# Patient Record
Sex: FEMALE | Race: NATIVE HAWAIIAN OR OTHER PACIFIC ISLANDER | HISPANIC OR LATINO | Employment: UNEMPLOYED | URBAN - NONMETROPOLITAN AREA
[De-identification: names, ages, dates, MRNs, and addresses within clinical notes are randomized per-mention and may not be internally consistent; named-entity substitution may affect disease eponyms.]

---

## 2024-06-05 ENCOUNTER — INITIAL PRENATAL (OUTPATIENT)
Age: 29
End: 2024-06-05

## 2024-06-05 VITALS
WEIGHT: 136 LBS | HEIGHT: 61 IN | BODY MASS INDEX: 25.68 KG/M2 | DIASTOLIC BLOOD PRESSURE: 64 MMHG | SYSTOLIC BLOOD PRESSURE: 108 MMHG

## 2024-06-05 DIAGNOSIS — Z34.82 MULTIGRAVIDA IN SECOND TRIMESTER: Primary | ICD-10-CM

## 2024-06-05 DIAGNOSIS — Z3A.22 22 WEEKS GESTATION OF PREGNANCY: ICD-10-CM

## 2024-06-05 PROCEDURE — G0123 SCREEN CERV/VAG THIN LAYER: HCPCS

## 2024-06-05 NOTE — PROGRESS NOTES
29-year old patient arrived to initiate prenatal care.     HPI: . No LMP recorded. Patient is pregnant.  Pregnancy was a surprise. Pre-pregnancy weight of 136 lb.    Previous prenatal history significant for: vaginal birth, no complications  History significant for: n/a    Feeling fetal movement daily    The following portion of the patient's history were reviewed and updated as needed: allergies, current medications, past family history, past medical history, social history, surgical history, and problem list.    ROS: All systems reviewed and are negative with exception of the following: amenorrhea      US ordered today, reviewed and shows IUP of 22w6d gestation and Estimated Date of Delivery: 10/3/24  Intrauterine pregnancy at 22 weeks 6 days by fetal biometry  Placental location: Posterior  Estimated fetal weight:  561 g  Fetal position: Vertex  NADIR: 14.7 cm    Pap Smear done today    Exam:  Wt: 136 lb for TWG of 0 kg (0 lb), B/P 108/64, FHTs 141   General Appearance:  healthy-appearing . Appropriate mood and behavior.  HEENT:  Neck supple, no thyroidmegaly.  Cardiorespiratory: HR str and reg. No murmur. Lungs clear. Resp even and unlabored.  Abd: Soft, nontender. No CVA tenderness.   Ext: Calves non-tender. No cyanosis or edema.    Diagnoses and all orders for this visit:    1. Multigravida in second trimester (Primary)  -     Chlamydia trachomatis, Neisseria gonorrhoeae, PCR w/ confirmation - Urine, Urine, Clean Catch  -     ABO / Rh  -     Ambulatory Referral to [facility]/Perinatology  -     Antibody Screen  -     CBC & Differential  -     Hepatitis B Surface Antigen  -     ToxASSURE Select 13 (MW) - Urine, Clean Catch  -     Urine Culture - Urine, Urine, Clean Catch  -     Rubella Antibody, IgG  -     RPR  -     HIV-1 / O / 2 Ag / Antibody  -     Hepatitis C Antibody  -     Liquid-based Pap Smear, Screening  -     [facility] Panorama Prenatal Test Full Panel: Panorama Test Plus 5 Additional Microdeletions  - Blood,  -     [facility] Horizon 14 (Pan-Ethnic Standard) - Blood,    2. 22 weeks gestation of pregnancy  Reviewed information in new OB packet, including OTC medications for use during pregnancy, second trimester of pregnancy and discomforts, regular OB routine, ffDNA/chromosomal risk and maternal carrier screening testing.  Advised to maintain regular activity and/or exercise. Discussed bleeding and pelvic pain warnings and other signs to report. Discussed and ordered initial prenatal labs today. Second Trimester of Pregnancy video and round ligament pain included in AVS.       Return to the office in 4 weeks for routine follow up and as needed with concerns.      This note has been signed electronically.     Lana Pedersen CNM APRN

## 2024-06-06 DIAGNOSIS — B37.9 CANDIDIASIS: Primary | ICD-10-CM

## 2024-06-06 LAB
GEN CATEG CVX/VAG CYTO-IMP: NORMAL
LAB AP CASE REPORT: NORMAL
LAB AP GYN ADDITIONAL INFORMATION: NORMAL
LAB AP GYN OTHER FINDINGS: NORMAL
Lab: NORMAL
PATH INTERP SPEC-IMP: NORMAL
STAT OF ADQ CVX/VAG CYTO-IMP: NORMAL

## 2024-06-13 LAB
Lab: NORMAL
NTRA 1P36 DELETION SYNDROME POPULATION-BASED RISK TEXT: NORMAL
NTRA 1P36 DELETION SYNDROME RESULT TEXT: NORMAL
NTRA 1P36 DELETION SYNDROME RISK SCORE TEXT: NORMAL
NTRA 22Q11.2 DELETION SYNDROME POPULATION-BASED RISK TEXT: NORMAL
NTRA 22Q11.2 DELETION SYNDROME RESULT TEXT: NORMAL
NTRA 22Q11.2 DELETION SYNDROME RISK SCORE TEXT: NORMAL
NTRA ANGELMAN SYNDROME POPULATION-BASED RISK TEXT: NORMAL
NTRA ANGELMAN SYNDROME RESULT TEXT: NORMAL
NTRA ANGELMAN SYNDROME RISK SCORE TEXT: NORMAL
NTRA CRI-DU-CHAT SYNDROME POPULATION-BASED RISK TEXT: NORMAL
NTRA CRI-DU-CHAT SYNDROME RESULT TEXT: NORMAL
NTRA CRI-DU-CHAT SYNDROME RISK SCORE TEXT: NORMAL
NTRA FETAL FRACTION: NORMAL
NTRA GENDER OF FETUS: NORMAL
NTRA MONOSOMY X AGE-BASED RISK TEXT: NORMAL
NTRA MONOSOMY X RESULT TEXT: NORMAL
NTRA MONOSOMY X RISK SCORE TEXT: NORMAL
NTRA PRADER-WILLI SYNDROME POPULATION-BASED RISK TEXT: NORMAL
NTRA PRADER-WILLI SYNDROME RESULT TEXT: NORMAL
NTRA PRADER-WILLI SYNDROME RISK SCORE TEXT: NORMAL
NTRA TRIPLOIDY RESULT TEXT: NORMAL
NTRA TRISOMY 13 AGE-BASED RISK TEXT: NORMAL
NTRA TRISOMY 13 RESULT TEXT: NORMAL
NTRA TRISOMY 13 RISK SCORE TEXT: NORMAL
NTRA TRISOMY 18 AGE-BASED RISK TEXT: NORMAL
NTRA TRISOMY 18 RESULT TEXT: NORMAL
NTRA TRISOMY 18 RISK SCORE TEXT: NORMAL
NTRA TRISOMY 21 AGE-BASED RISK TEXT: NORMAL
NTRA TRISOMY 21 RESULT TEXT: NORMAL
NTRA TRISOMY 21 RISK SCORE TEXT: NORMAL

## 2024-06-14 LAB
ABO GROUP BLD: NORMAL
BACTERIA UR CULT: NORMAL
BACTERIA UR CULT: NORMAL
BASOPHILS # BLD AUTO: 0 X10E3/UL (ref 0–0.2)
BASOPHILS NFR BLD AUTO: 1 %
BLD GP AB SCN SERPL QL: NEGATIVE
C TRACH RRNA SPEC QL NAA+PROBE: NEGATIVE
DRUGS UR: NORMAL
EOSINOPHIL # BLD AUTO: 0.1 X10E3/UL (ref 0–0.4)
EOSINOPHIL NFR BLD AUTO: 1 %
ERYTHROCYTE [DISTWIDTH] IN BLOOD BY AUTOMATED COUNT: 13.4 % (ref 11.7–15.4)
HBV SURFACE AG SERPL QL IA: NEGATIVE
HCT VFR BLD AUTO: 34.9 % (ref 34–46.6)
HCV IGG SERPL QL IA: NON REACTIVE
HGB BLD-MCNC: 11.4 G/DL (ref 11.1–15.9)
HIV 1+2 AB+HIV1 P24 AG SERPL QL IA: NON REACTIVE
IMM GRANULOCYTES # BLD AUTO: 0 X10E3/UL (ref 0–0.1)
IMM GRANULOCYTES NFR BLD AUTO: 1 %
LYMPHOCYTES # BLD AUTO: 1.5 X10E3/UL (ref 0.7–3.1)
LYMPHOCYTES NFR BLD AUTO: 18 %
MCH RBC QN AUTO: 28.6 PG (ref 26.6–33)
MCHC RBC AUTO-ENTMCNC: 32.7 G/DL (ref 31.5–35.7)
MCV RBC AUTO: 88 FL (ref 79–97)
MONOCYTES # BLD AUTO: 0.6 X10E3/UL (ref 0.1–0.9)
MONOCYTES NFR BLD AUTO: 7 %
N GONORRHOEA RRNA SPEC QL NAA+PROBE: NEGATIVE
NEUTROPHILS # BLD AUTO: 6 X10E3/UL (ref 1.4–7)
NEUTROPHILS NFR BLD AUTO: 72 %
PLATELET # BLD AUTO: 192 X10E3/UL (ref 150–450)
RBC # BLD AUTO: 3.98 X10E6/UL (ref 3.77–5.28)
RH BLD: POSITIVE
RPR SER QL: NON REACTIVE
RUBV IGG SERPL IA-ACNC: 1.85 INDEX
WBC # BLD AUTO: 8.2 X10E3/UL (ref 3.4–10.8)

## 2024-06-16 LAB
Lab: NEGATIVE
Lab: NORMAL
NTRA ALPHA-THALASSEMIA: NEGATIVE
NTRA BETA-HEMOGLOBINOPATHIES: NEGATIVE
NTRA CANAVAN DISEASE: NEGATIVE
NTRA CYSTIC FIBROSIS: NEGATIVE
NTRA DUCHENNE/BECKER MUSCULAR DYSTROPHY: NEGATIVE
NTRA FAMILIAL DYSAUTONOMIA: NEGATIVE
NTRA FRAGILE X SYNDROME: NEGATIVE
NTRA GALACTOSEMIA: NEGATIVE
NTRA GAUCHER DISEASE: NEGATIVE
NTRA MEDIUM CHAIN ACYL-COA DEHYDROGENASE DEFICIENCY: NEGATIVE
NTRA POLYCYSTIC KIDNEY DISEASE, AUTOSOMAL RECESSIVE: NEGATIVE
NTRA SMITH-LEMLI-OPITZ SYNDROME: NEGATIVE
NTRA SPINAL MUSCULAR ATROPHY: NEGATIVE
NTRA TAY-SACHS DISEASE: NEGATIVE

## 2024-07-02 ENCOUNTER — ROUTINE PRENATAL (OUTPATIENT)
Age: 29
End: 2024-07-02

## 2024-07-02 VITALS — BODY MASS INDEX: 26.45 KG/M2 | WEIGHT: 140 LBS | SYSTOLIC BLOOD PRESSURE: 112 MMHG | DIASTOLIC BLOOD PRESSURE: 72 MMHG

## 2024-07-02 DIAGNOSIS — O09.33 LIMITED PRENATAL CARE IN THIRD TRIMESTER: Primary | ICD-10-CM

## 2024-07-02 NOTE — PROGRESS NOTES
Good fetal movement  Prior uncomplicated pregnancy and vaginal delivery  Will need to reschedule anatomy US  Reviewed normal prenatal labs, normal ffDNA, and normal maternal carrier screen  Glucola and Hgb ordered for next visit  Discussed Torres Lemons contractions    Diagnoses and all orders for this visit:    1. Limited prenatal care in third trimester (Primary)

## 2024-07-18 ENCOUNTER — ROUTINE PRENATAL (OUTPATIENT)
Age: 29
End: 2024-07-18
Payer: MEDICAID

## 2024-07-18 VITALS — BODY MASS INDEX: 27.02 KG/M2 | WEIGHT: 143 LBS | SYSTOLIC BLOOD PRESSURE: 108 MMHG | DIASTOLIC BLOOD PRESSURE: 74 MMHG

## 2024-07-18 DIAGNOSIS — O09.33 LIMITED PRENATAL CARE IN THIRD TRIMESTER: Primary | ICD-10-CM

## 2024-07-18 DIAGNOSIS — Z34.83 ENCOUNTER FOR SUPERVISION OF OTHER NORMAL PREGNANCY IN THIRD TRIMESTER: ICD-10-CM

## 2024-07-18 NOTE — PROGRESS NOTES
Good fetal movement  Had to reschedule anatomy ultrasound  Glucola and Hgb today, Rh positive  Discuss Tdap next visit  Discussed Rosebud Lemons contractions    Diagnoses and all orders for this visit:    1. Limited prenatal care in third trimester (Primary)    2. Encounter for supervision of other normal pregnancy in third trimester  -     Gestational Screen 1 Hr ([facility])  -     Hemoglobin  -     RPR Qualitative with Reflex to Quant

## 2024-07-19 LAB
GLUCOSE 1H P 50 G GLC PO SERPL-MCNC: 146 MG/DL (ref 70–139)
HGB BLD-MCNC: 11.4 G/DL (ref 11.1–15.9)
RPR SER QL: NON REACTIVE

## 2024-08-01 ENCOUNTER — ROUTINE PRENATAL (OUTPATIENT)
Age: 29
End: 2024-08-01
Payer: MEDICAID

## 2024-08-01 VITALS — SYSTOLIC BLOOD PRESSURE: 116 MMHG | BODY MASS INDEX: 27.59 KG/M2 | DIASTOLIC BLOOD PRESSURE: 72 MMHG | WEIGHT: 146 LBS

## 2024-08-01 DIAGNOSIS — Z3A.31 31 WEEKS GESTATION OF PREGNANCY: Primary | ICD-10-CM

## 2024-08-01 DIAGNOSIS — O99.810 ABNORMAL GLUCOSE TOLERANCE TEST IN PREGNANCY: ICD-10-CM

## 2024-08-01 NOTE — PROGRESS NOTES
Good fetal movement  Reviewed normal Hgb  Reviewed elevated 1 hour Glucola, will return for 3 hour GTT  Discuss Tdap next visit   labor precautions    Diagnoses and all orders for this visit:    1. 31 weeks gestation of pregnancy (Primary)  -     POC Urinalysis Dipstick    2. Abnormal glucose tolerance test in pregnancy  -     Gestational Screen 3 Hr ([facility])

## 2024-08-07 ENCOUNTER — TELEPHONE (OUTPATIENT)
Dept: OBSTETRICS AND GYNECOLOGY | Age: 29
End: 2024-08-07
Payer: MEDICAID

## 2024-08-07 LAB
GLUCOSE 1H P 100 G GLC PO SERPL-MCNC: 181 MG/DL (ref 70–179)
GLUCOSE 2H P 100 G GLC PO SERPL-MCNC: 103 MG/DL (ref 70–154)
GLUCOSE 3H P 100 G GLC PO SERPL-MCNC: 135 MG/DL (ref 70–139)
GLUCOSE P FAST SERPL-MCNC: 70 MG/DL (ref 70–94)
Lab: ABNORMAL
Lab: NORMAL

## 2024-08-07 NOTE — TELEPHONE ENCOUNTER
Hallie with [facility] asking for permission to release the 3h gtt results on this patient. I spoke with Hallie by phone and they were concerned because the patient's 3h result was above her 2h result. Notified Hallie to release results to Dr Carty for his review.

## 2024-08-15 ENCOUNTER — ROUTINE PRENATAL (OUTPATIENT)
Age: 29
End: 2024-08-15
Payer: MEDICAID

## 2024-08-15 VITALS — WEIGHT: 147 LBS | DIASTOLIC BLOOD PRESSURE: 82 MMHG | BODY MASS INDEX: 27.78 KG/M2 | SYSTOLIC BLOOD PRESSURE: 112 MMHG

## 2024-08-15 DIAGNOSIS — Z3A.33 33 WEEKS GESTATION OF PREGNANCY: ICD-10-CM

## 2024-08-15 DIAGNOSIS — Z34.83 ENCOUNTER FOR SUPERVISION OF OTHER NORMAL PREGNANCY IN THIRD TRIMESTER: Primary | ICD-10-CM

## 2024-08-15 NOTE — PROGRESS NOTES
Good fetal movement  No contractions  Reviewed normal 3 hour Glucola  Tdap in office today   labor precautions    Diagnoses and all orders for this visit:    1. Encounter for supervision of other normal pregnancy in third trimester (Primary)  -     Tdap Vaccine Greater Than or Equal To 6yo IM    2. 33 weeks gestation of pregnancy

## 2024-08-30 ENCOUNTER — ROUTINE PRENATAL (OUTPATIENT)
Age: 29
End: 2024-08-30
Payer: MEDICAID

## 2024-08-30 VITALS — WEIGHT: 148 LBS | DIASTOLIC BLOOD PRESSURE: 84 MMHG | BODY MASS INDEX: 27.96 KG/M2 | SYSTOLIC BLOOD PRESSURE: 112 MMHG

## 2024-08-30 DIAGNOSIS — Z34.83 ENCOUNTER FOR SUPERVISION OF OTHER NORMAL PREGNANCY IN THIRD TRIMESTER: Primary | ICD-10-CM

## 2024-08-30 NOTE — PROGRESS NOTES
Good fetal movement  Labor precautions  Reviewed normal 3 hour glucose screening  Growth US next visit for size < dates and late entry to care  GBS and cx's next visit    Diagnoses and all orders for this visit:    1. Encounter for supervision of other normal pregnancy in third trimester (Primary)

## 2024-09-03 ENCOUNTER — ROUTINE PRENATAL (OUTPATIENT)
Age: 29
End: 2024-09-03
Payer: MEDICAID

## 2024-09-03 VITALS — DIASTOLIC BLOOD PRESSURE: 84 MMHG | WEIGHT: 148 LBS | SYSTOLIC BLOOD PRESSURE: 112 MMHG | BODY MASS INDEX: 27.96 KG/M2

## 2024-09-03 DIAGNOSIS — Z34.83 ENCOUNTER FOR SUPERVISION OF OTHER NORMAL PREGNANCY IN THIRD TRIMESTER: Primary | ICD-10-CM

## 2024-09-03 PROCEDURE — 99213 OFFICE O/P EST LOW 20 MIN: CPT | Performed by: OBSTETRICS & GYNECOLOGY

## 2024-09-03 NOTE — PROGRESS NOTES
Good fetal movement  Has had a few contractions  US today 35% growth, NADIR 14cm, vertex  Cervix 1cm/50/-3 moderate, posterior  GBS and GC/Chl ordered and done  Labor instructions    Diagnoses and all orders for this visit:    1. Encounter for supervision of other normal pregnancy in third trimester (Primary)  -     Chlamydia trachomatis, Neisseria gonorrhoeae, PCR w/ confirmation - Swab, Cervix  -     Strep B Screen - Swab, Vaginal/Rectum

## 2024-09-06 LAB
C TRACH RRNA SPEC QL NAA+PROBE: NEGATIVE
GP B STREP DNA SPEC QL NAA+PROBE: NEGATIVE
N GONORRHOEA RRNA SPEC QL NAA+PROBE: NEGATIVE

## 2024-09-10 ENCOUNTER — ROUTINE PRENATAL (OUTPATIENT)
Age: 29
End: 2024-09-10
Payer: MEDICAID

## 2024-09-10 VITALS — SYSTOLIC BLOOD PRESSURE: 116 MMHG | WEIGHT: 149 LBS | DIASTOLIC BLOOD PRESSURE: 82 MMHG | BODY MASS INDEX: 28.15 KG/M2

## 2024-09-10 DIAGNOSIS — Z34.83 ENCOUNTER FOR SUPERVISION OF OTHER NORMAL PREGNANCY IN THIRD TRIMESTER: Primary | ICD-10-CM

## 2024-09-10 PROCEDURE — 99213 OFFICE O/P EST LOW 20 MIN: CPT | Performed by: OBSTETRICS & GYNECOLOGY

## 2024-09-10 NOTE — PROGRESS NOTES
Good fetal movement  Has had a few contractions  Reviewed GBS negative  Labor instructions    Diagnoses and all orders for this visit:    1. Encounter for supervision of other normal pregnancy in third trimester (Primary)

## 2024-09-17 ENCOUNTER — ROUTINE PRENATAL (OUTPATIENT)
Age: 29
End: 2024-09-17
Payer: MEDICAID

## 2024-09-17 VITALS — BODY MASS INDEX: 28.34 KG/M2 | DIASTOLIC BLOOD PRESSURE: 80 MMHG | SYSTOLIC BLOOD PRESSURE: 114 MMHG | WEIGHT: 150 LBS

## 2024-09-17 DIAGNOSIS — Z3A.37 37 WEEKS GESTATION OF PREGNANCY: Primary | ICD-10-CM

## 2024-09-17 DIAGNOSIS — B37.31 VAGINAL YEAST INFECTION: ICD-10-CM

## 2024-09-17 DIAGNOSIS — R10.2 VAGINAL PAIN: ICD-10-CM

## 2024-09-17 LAB
BILIRUB BLD-MCNC: NEGATIVE MG/DL
CLARITY, POC: CLEAR
COLOR UR: YELLOW
GLUCOSE UR STRIP-MCNC: NEGATIVE MG/DL
KETONES UR QL: ABNORMAL
LEUKOCYTE EST, POC: ABNORMAL
NITRITE UR-MCNC: NEGATIVE MG/ML
PH UR: 8 [PH] (ref 5–8)
PROT UR STRIP-MCNC: ABNORMAL MG/DL
RBC # UR STRIP: NEGATIVE /UL
SP GR UR: 1.01 (ref 1–1.03)
UROBILINOGEN UR QL: NORMAL

## 2024-09-17 PROCEDURE — 99213 OFFICE O/P EST LOW 20 MIN: CPT

## 2024-09-17 RX ORDER — MICONAZOLE NITRATE 2 %
1 CREAM WITH APPLICATOR VAGINAL NIGHTLY
Qty: 7 G | Refills: 0 | Status: SHIPPED | OUTPATIENT
Start: 2024-09-17 | End: 2024-09-24

## 2024-09-19 LAB
A VAGINAE DNA VAG QL NAA+PROBE: ABNORMAL SCORE
BVAB2 DNA VAG QL NAA+PROBE: ABNORMAL SCORE
C ALBICANS DNA VAG QL NAA+PROBE: POSITIVE
C GLABRATA DNA VAG QL NAA+PROBE: NEGATIVE
C TRACH DNA SPEC QL NAA+PROBE: NEGATIVE
MEGA1 DNA VAG QL NAA+PROBE: ABNORMAL SCORE
N GONORRHOEA DNA VAG QL NAA+PROBE: NEGATIVE
T VAGINALIS DNA VAG QL NAA+PROBE: NEGATIVE

## 2024-09-24 ENCOUNTER — ROUTINE PRENATAL (OUTPATIENT)
Age: 29
End: 2024-09-24
Payer: MEDICAID

## 2024-09-24 VITALS — BODY MASS INDEX: 28.72 KG/M2 | DIASTOLIC BLOOD PRESSURE: 84 MMHG | WEIGHT: 152 LBS | SYSTOLIC BLOOD PRESSURE: 130 MMHG

## 2024-09-24 DIAGNOSIS — Z34.83 ENCOUNTER FOR SUPERVISION OF OTHER NORMAL PREGNANCY IN THIRD TRIMESTER: Primary | ICD-10-CM

## 2024-09-24 PROCEDURE — 99213 OFFICE O/P EST LOW 20 MIN: CPT | Performed by: OBSTETRICS & GYNECOLOGY

## 2024-09-30 ENCOUNTER — HOSPITAL ENCOUNTER (INPATIENT)
Facility: HOSPITAL | Age: 29
LOS: 2 days | Discharge: HOME | End: 2024-10-02
Attending: OBSTETRICS & GYNECOLOGY | Admitting: OBSTETRICS & GYNECOLOGY
Payer: MEDICAID

## 2024-09-30 PROBLEM — R10.9 ABDOMINAL PAIN DURING PREGNANCY IN THIRD TRIMESTER: Status: RESOLVED | Noted: 2024-09-30 | Resolved: 2024-09-30

## 2024-09-30 PROBLEM — O26.893 ABDOMINAL PAIN DURING PREGNANCY IN THIRD TRIMESTER: Status: ACTIVE | Noted: 2024-09-30

## 2024-09-30 PROBLEM — Z3A.39 39 WEEKS GESTATION OF PREGNANCY: Status: RESOLVED | Noted: 2024-09-30 | Resolved: 2024-09-30

## 2024-09-30 PROBLEM — O26.893 ABDOMINAL PAIN DURING PREGNANCY IN THIRD TRIMESTER: Status: RESOLVED | Noted: 2024-09-30 | Resolved: 2024-09-30

## 2024-09-30 PROBLEM — Z3A.39 39 WEEKS GESTATION OF PREGNANCY: Status: ACTIVE | Noted: 2024-09-30

## 2024-09-30 PROBLEM — Z34.90 PREGNANCY: Status: ACTIVE | Noted: 2024-09-30

## 2024-09-30 PROBLEM — Z34.90 PREGNANCY: Status: RESOLVED | Noted: 2024-09-30 | Resolved: 2024-09-30

## 2024-09-30 PROBLEM — R10.9 ABDOMINAL PAIN DURING PREGNANCY IN THIRD TRIMESTER: Status: ACTIVE | Noted: 2024-09-30

## 2024-09-30 LAB
ABO GROUP BLD: NORMAL
BLD GP AB SCN SERPL QL: NEGATIVE
DEPRECATED RDW RBC AUTO: 45.9 FL (ref 37–54)
ERYTHROCYTE [DISTWIDTH] IN BLOOD BY AUTOMATED COUNT: 15.9 % (ref 12.3–15.4)
HCT VFR BLD AUTO: 36.4 % (ref 34–46.6)
HGB BLD-MCNC: 11.4 G/DL (ref 12–15.9)
MCH RBC QN AUTO: 25.2 PG (ref 26.6–33)
MCHC RBC AUTO-ENTMCNC: 31.3 G/DL (ref 31.5–35.7)
MCV RBC AUTO: 80.5 FL (ref 79–97)
PLATELET # BLD AUTO: 176 10*3/MM3 (ref 140–450)
PMV BLD AUTO: 13.1 FL (ref 6–12)
RBC # BLD AUTO: 4.52 10*6/MM3 (ref 3.77–5.28)
RH BLD: POSITIVE
T&S EXPIRATION DATE: NORMAL
TREPONEMA PALLIDUM IGG+IGM AB [PRESENCE] IN SERUM OR PLASMA BY IMMUNOASSAY: NORMAL
WBC NRBC COR # BLD AUTO: 12.59 10*3/MM3 (ref 3.4–10.8)

## 2024-09-30 PROCEDURE — 10907ZC DRAINAGE OF AMNIOTIC FLUID, THERAPEUTIC FROM PRODUCTS OF CONCEPTION, VIA NATURAL OR ARTIFICIAL OPENING: ICD-10-PCS | Performed by: OBSTETRICS & GYNECOLOGY

## 2024-09-30 PROCEDURE — 25810000003 LACTATED RINGERS SOLUTION: Performed by: OBSTETRICS & GYNECOLOGY

## 2024-09-30 PROCEDURE — 86900 BLOOD TYPING SEROLOGIC ABO: CPT | Performed by: OBSTETRICS & GYNECOLOGY

## 2024-09-30 PROCEDURE — 86901 BLOOD TYPING SEROLOGIC RH(D): CPT | Performed by: OBSTETRICS & GYNECOLOGY

## 2024-09-30 PROCEDURE — 0HQ9XZZ REPAIR PERINEUM SKIN, EXTERNAL APPROACH: ICD-10-PCS | Performed by: OBSTETRICS & GYNECOLOGY

## 2024-09-30 PROCEDURE — 86780 TREPONEMA PALLIDUM: CPT | Performed by: OBSTETRICS & GYNECOLOGY

## 2024-09-30 PROCEDURE — 86850 RBC ANTIBODY SCREEN: CPT | Performed by: OBSTETRICS & GYNECOLOGY

## 2024-09-30 PROCEDURE — 85027 COMPLETE CBC AUTOMATED: CPT | Performed by: OBSTETRICS & GYNECOLOGY

## 2024-09-30 RX ORDER — ONDANSETRON 4 MG/1
4 TABLET, ORALLY DISINTEGRATING ORAL EVERY 6 HOURS PRN
Status: DISCONTINUED | OUTPATIENT
Start: 2024-09-30 | End: 2024-09-30 | Stop reason: HOSPADM

## 2024-09-30 RX ORDER — CALCIUM CARBONATE 500 MG/1
2 TABLET, CHEWABLE ORAL 3 TIMES DAILY PRN
Status: DISCONTINUED | OUTPATIENT
Start: 2024-09-30 | End: 2024-10-02 | Stop reason: HOSPADM

## 2024-09-30 RX ORDER — IBUPROFEN 600 MG/1
600 TABLET, FILM COATED ORAL EVERY 6 HOURS PRN
Status: DISCONTINUED | OUTPATIENT
Start: 2024-09-30 | End: 2024-10-02 | Stop reason: HOSPADM

## 2024-09-30 RX ORDER — PRENATAL VIT/IRON FUM/FOLIC AC 27MG-0.8MG
1 TABLET ORAL DAILY
Status: DISCONTINUED | OUTPATIENT
Start: 2024-09-30 | End: 2024-10-02 | Stop reason: HOSPADM

## 2024-09-30 RX ORDER — OXYTOCIN/0.9 % SODIUM CHLORIDE 30/500 ML
PLASTIC BAG, INJECTION (ML) INTRAVENOUS
Status: COMPLETED
Start: 2024-09-30 | End: 2024-09-30

## 2024-09-30 RX ORDER — ACETAMINOPHEN 325 MG/1
650 TABLET ORAL EVERY 6 HOURS PRN
Status: DISCONTINUED | OUTPATIENT
Start: 2024-09-30 | End: 2024-10-02 | Stop reason: HOSPADM

## 2024-09-30 RX ORDER — PROMETHAZINE HYDROCHLORIDE 25 MG/1
12.5 TABLET ORAL EVERY 6 HOURS PRN
Status: DISCONTINUED | OUTPATIENT
Start: 2024-09-30 | End: 2024-09-30 | Stop reason: SDUPTHER

## 2024-09-30 RX ORDER — HYDROCODONE BITARTRATE AND ACETAMINOPHEN 5; 325 MG/1; MG/1
1 TABLET ORAL EVERY 4 HOURS PRN
Status: DISCONTINUED | OUTPATIENT
Start: 2024-09-30 | End: 2024-10-02 | Stop reason: HOSPADM

## 2024-09-30 RX ORDER — ONDANSETRON 4 MG/1
4 TABLET, ORALLY DISINTEGRATING ORAL EVERY 6 HOURS PRN
Status: DISCONTINUED | OUTPATIENT
Start: 2024-09-30 | End: 2024-09-30 | Stop reason: SDUPTHER

## 2024-09-30 RX ORDER — ONDANSETRON 4 MG/1
4 TABLET, ORALLY DISINTEGRATING ORAL EVERY 8 HOURS PRN
Status: DISCONTINUED | OUTPATIENT
Start: 2024-09-30 | End: 2024-10-02 | Stop reason: HOSPADM

## 2024-09-30 RX ORDER — ACETAMINOPHEN 325 MG/1
650 TABLET ORAL EVERY 4 HOURS PRN
Status: DISCONTINUED | OUTPATIENT
Start: 2024-09-30 | End: 2024-10-01 | Stop reason: SDUPTHER

## 2024-09-30 RX ORDER — MISOPROSTOL 200 UG/1
800 TABLET ORAL ONCE AS NEEDED
Status: DISCONTINUED | OUTPATIENT
Start: 2024-09-30 | End: 2024-09-30 | Stop reason: HOSPADM

## 2024-09-30 RX ORDER — ACETAMINOPHEN 325 MG/1
650 TABLET ORAL EVERY 4 HOURS PRN
Status: DISCONTINUED | OUTPATIENT
Start: 2024-09-30 | End: 2024-09-30 | Stop reason: HOSPADM

## 2024-09-30 RX ORDER — METHYLERGONOVINE MALEATE 0.2 MG/ML
200 INJECTION INTRAVENOUS ONCE AS NEEDED
Status: DISCONTINUED | OUTPATIENT
Start: 2024-09-30 | End: 2024-09-30 | Stop reason: HOSPADM

## 2024-09-30 RX ORDER — MORPHINE SULFATE 2 MG/ML
1 INJECTION, SOLUTION INTRAMUSCULAR; INTRAVENOUS EVERY 4 HOURS PRN
Status: DISCONTINUED | OUTPATIENT
Start: 2024-09-30 | End: 2024-10-02 | Stop reason: HOSPADM

## 2024-09-30 RX ORDER — METHYLERGONOVINE MALEATE 0.2 MG/ML
200 INJECTION INTRAVENOUS ONCE AS NEEDED
Status: DISCONTINUED | OUTPATIENT
Start: 2024-09-30 | End: 2024-10-02 | Stop reason: HOSPADM

## 2024-09-30 RX ORDER — SODIUM CHLORIDE 9 MG/ML
40 INJECTION, SOLUTION INTRAVENOUS AS NEEDED
Status: DISCONTINUED | OUTPATIENT
Start: 2024-09-30 | End: 2024-09-30 | Stop reason: HOSPADM

## 2024-09-30 RX ORDER — CALCIUM CARBONATE 500 MG/1
2 TABLET, CHEWABLE ORAL 3 TIMES DAILY PRN
Status: DISCONTINUED | OUTPATIENT
Start: 2024-09-30 | End: 2024-09-30 | Stop reason: SDUPTHER

## 2024-09-30 RX ORDER — SODIUM CHLORIDE 0.9 % (FLUSH) 0.9 %
10 SYRINGE (ML) INJECTION EVERY 12 HOURS SCHEDULED
Status: DISCONTINUED | OUTPATIENT
Start: 2024-09-30 | End: 2024-09-30 | Stop reason: HOSPADM

## 2024-09-30 RX ORDER — ONDANSETRON 2 MG/ML
4 INJECTION INTRAMUSCULAR; INTRAVENOUS EVERY 6 HOURS PRN
Status: DISCONTINUED | OUTPATIENT
Start: 2024-09-30 | End: 2024-10-02 | Stop reason: HOSPADM

## 2024-09-30 RX ORDER — HYDROCORTISONE 25 MG/G
1 CREAM TOPICAL AS NEEDED
Status: DISCONTINUED | OUTPATIENT
Start: 2024-09-30 | End: 2024-10-02 | Stop reason: HOSPADM

## 2024-09-30 RX ORDER — SODIUM CHLORIDE, SODIUM LACTATE, POTASSIUM CHLORIDE, CALCIUM CHLORIDE 600; 310; 30; 20 MG/100ML; MG/100ML; MG/100ML; MG/100ML
125 INJECTION, SOLUTION INTRAVENOUS CONTINUOUS
Status: DISCONTINUED | OUTPATIENT
Start: 2024-09-30 | End: 2024-10-02 | Stop reason: HOSPADM

## 2024-09-30 RX ORDER — MORPHINE SULFATE 10 MG/ML
10 INJECTION INTRAMUSCULAR; INTRAVENOUS; SUBCUTANEOUS
Status: DISCONTINUED | OUTPATIENT
Start: 2024-09-30 | End: 2024-09-30 | Stop reason: HOSPADM

## 2024-09-30 RX ORDER — PROMETHAZINE HYDROCHLORIDE 25 MG/1
12.5 TABLET ORAL EVERY 6 HOURS PRN
Status: DISCONTINUED | OUTPATIENT
Start: 2024-09-30 | End: 2024-09-30 | Stop reason: HOSPADM

## 2024-09-30 RX ORDER — OXYTOCIN/0.9 % SODIUM CHLORIDE 30/500 ML
999 PLASTIC BAG, INJECTION (ML) INTRAVENOUS ONCE
Status: COMPLETED | OUTPATIENT
Start: 2024-09-30 | End: 2024-09-30

## 2024-09-30 RX ORDER — OXYTOCIN/0.9 % SODIUM CHLORIDE 30/500 ML
250 PLASTIC BAG, INJECTION (ML) INTRAVENOUS CONTINUOUS
Status: DISPENSED | OUTPATIENT
Start: 2024-09-30 | End: 2024-09-30

## 2024-09-30 RX ORDER — LIDOCAINE HYDROCHLORIDE 20 MG/ML
20 INJECTION, SOLUTION INFILTRATION; PERINEURAL ONCE AS NEEDED
Status: COMPLETED | OUTPATIENT
Start: 2024-09-30 | End: 2024-09-30

## 2024-09-30 RX ORDER — PROMETHAZINE HYDROCHLORIDE 12.5 MG/1
12.5 SUPPOSITORY RECTAL EVERY 6 HOURS PRN
Status: DISCONTINUED | OUTPATIENT
Start: 2024-09-30 | End: 2024-10-02 | Stop reason: HOSPADM

## 2024-09-30 RX ORDER — PROMETHAZINE HYDROCHLORIDE 12.5 MG/1
12.5 SUPPOSITORY RECTAL EVERY 6 HOURS PRN
Status: DISCONTINUED | OUTPATIENT
Start: 2024-09-30 | End: 2024-09-30 | Stop reason: HOSPADM

## 2024-09-30 RX ORDER — FAMOTIDINE 20 MG/1
20 TABLET, FILM COATED ORAL ONCE AS NEEDED
Status: DISCONTINUED | OUTPATIENT
Start: 2024-09-30 | End: 2024-10-02 | Stop reason: HOSPADM

## 2024-09-30 RX ORDER — ONDANSETRON 2 MG/ML
4 INJECTION INTRAMUSCULAR; INTRAVENOUS EVERY 6 HOURS PRN
Status: DISCONTINUED | OUTPATIENT
Start: 2024-09-30 | End: 2024-09-30 | Stop reason: HOSPADM

## 2024-09-30 RX ORDER — CARBOPROST TROMETHAMINE 250 UG/ML
250 INJECTION, SOLUTION INTRAMUSCULAR
Status: DISCONTINUED | OUTPATIENT
Start: 2024-09-30 | End: 2024-09-30 | Stop reason: HOSPADM

## 2024-09-30 RX ORDER — MISOPROSTOL 200 UG/1
600 TABLET ORAL ONCE AS NEEDED
Status: DISCONTINUED | OUTPATIENT
Start: 2024-09-30 | End: 2024-10-02 | Stop reason: HOSPADM

## 2024-09-30 RX ORDER — HYDROXYZINE HYDROCHLORIDE 25 MG/1
50 TABLET, FILM COATED ORAL NIGHTLY PRN
Status: DISCONTINUED | OUTPATIENT
Start: 2024-09-30 | End: 2024-10-02 | Stop reason: HOSPADM

## 2024-09-30 RX ORDER — NALOXONE HCL 0.4 MG/ML
0.4 VIAL (ML) INJECTION
Status: DISCONTINUED | OUTPATIENT
Start: 2024-09-30 | End: 2024-10-02 | Stop reason: HOSPADM

## 2024-09-30 RX ORDER — SODIUM CHLORIDE 0.9 % (FLUSH) 0.9 %
10 SYRINGE (ML) INJECTION AS NEEDED
Status: DISCONTINUED | OUTPATIENT
Start: 2024-09-30 | End: 2024-09-30 | Stop reason: HOSPADM

## 2024-09-30 RX ORDER — PROMETHAZINE HYDROCHLORIDE 25 MG/1
25 TABLET ORAL EVERY 6 HOURS PRN
Status: DISCONTINUED | OUTPATIENT
Start: 2024-09-30 | End: 2024-10-02 | Stop reason: HOSPADM

## 2024-09-30 RX ORDER — TERBUTALINE SULFATE 1 MG/ML
0.25 INJECTION, SOLUTION SUBCUTANEOUS AS NEEDED
Status: DISCONTINUED | OUTPATIENT
Start: 2024-09-30 | End: 2024-09-30 | Stop reason: HOSPADM

## 2024-09-30 RX ORDER — DOCUSATE SODIUM 100 MG/1
100 CAPSULE, LIQUID FILLED ORAL 2 TIMES DAILY
Status: DISCONTINUED | OUTPATIENT
Start: 2024-09-30 | End: 2024-10-02 | Stop reason: HOSPADM

## 2024-09-30 RX ORDER — HYDROCODONE BITARTRATE AND ACETAMINOPHEN 10; 325 MG/1; MG/1
1 TABLET ORAL EVERY 4 HOURS PRN
Status: DISCONTINUED | OUTPATIENT
Start: 2024-09-30 | End: 2024-10-02 | Stop reason: HOSPADM

## 2024-09-30 RX ORDER — FAMOTIDINE 10 MG/ML
20 INJECTION, SOLUTION INTRAVENOUS ONCE AS NEEDED
Status: DISCONTINUED | OUTPATIENT
Start: 2024-09-30 | End: 2024-10-02 | Stop reason: HOSPADM

## 2024-09-30 RX ORDER — ONDANSETRON 2 MG/ML
4 INJECTION INTRAMUSCULAR; INTRAVENOUS EVERY 6 HOURS PRN
Status: DISCONTINUED | OUTPATIENT
Start: 2024-09-30 | End: 2024-09-30 | Stop reason: SDUPTHER

## 2024-09-30 RX ORDER — SODIUM CHLORIDE 0.9 % (FLUSH) 0.9 %
1-10 SYRINGE (ML) INJECTION AS NEEDED
Status: DISCONTINUED | OUTPATIENT
Start: 2024-09-30 | End: 2024-10-02 | Stop reason: HOSPADM

## 2024-09-30 RX ORDER — OXYTOCIN/0.9 % SODIUM CHLORIDE 30/500 ML
125 PLASTIC BAG, INJECTION (ML) INTRAVENOUS ONCE AS NEEDED
Status: DISCONTINUED | OUTPATIENT
Start: 2024-09-30 | End: 2024-10-02 | Stop reason: HOSPADM

## 2024-09-30 RX ORDER — BISACODYL 10 MG
10 SUPPOSITORY, RECTAL RECTAL DAILY PRN
Status: DISCONTINUED | OUTPATIENT
Start: 2024-10-01 | End: 2024-10-02 | Stop reason: HOSPADM

## 2024-09-30 RX ORDER — PROMETHAZINE HYDROCHLORIDE 12.5 MG/1
12.5 SUPPOSITORY RECTAL EVERY 6 HOURS PRN
Status: DISCONTINUED | OUTPATIENT
Start: 2024-09-30 | End: 2024-09-30 | Stop reason: SDUPTHER

## 2024-09-30 RX ADMIN — DOCUSATE SODIUM 100 MG: 100 CAPSULE, LIQUID FILLED ORAL at 09:00

## 2024-09-30 RX ADMIN — PRENATAL VIT W/ FE FUMARATE-FA TAB 27-0.8 MG 1 TABLET: 27-0.8 TAB at 09:00

## 2024-09-30 RX ADMIN — LIDOCAINE HYDROCHLORIDE 20 ML: 20 INJECTION, SOLUTION INFILTRATION; PERINEURAL at 03:38

## 2024-09-30 RX ADMIN — SODIUM CHLORIDE, POTASSIUM CHLORIDE, SODIUM LACTATE AND CALCIUM CHLORIDE 1000 ML: 600; 310; 30; 20 INJECTION, SOLUTION INTRAVENOUS at 02:48

## 2024-09-30 RX ADMIN — DOCUSATE SODIUM 100 MG: 100 CAPSULE, LIQUID FILLED ORAL at 20:14

## 2024-09-30 RX ADMIN — Medication 999 ML/HR: at 03:27

## 2024-09-30 NOTE — LACTATION NOTE
Mother's Name: Amanda Phone #:  Infant Name: Nae  :2024 at 0325  Gestation:39w4d  Day of life:0  Birth weight:  7-7.9 (3400g) Discharge weight:  Weight Loss:   24 hour Summary of Feeds: 1BF Voids:  Stools:  Assistive devices (shields, shells, etc):na  Significant Maternal history:, breast fed first child for 2 years ( now 12 yrs old), nonenglish speaking- SELENE'charissa', late prenatal care at 22w6d  Maternal Concerns:  denies  Maternal Goal: breast and formula  Mother's Medications:   Breastpump for home: yes  Ped follow up appt:      Family at bedside. Confirms mother has a pump at home- gift from family member. Manual pump provided as well. Translation service used for consult. Cam[place]onian breastfeeding book provided. Reiterated supply/demand and need to pump if formula provided in place of breastfeeding session. Reiterated feeding frequency desired: on demand with infant hunger cues or waking infant every 3 hours, goal of 15-20 mins on each breast. Discussed output goals through the first week. Questions denied at this time. Encouragement and support provided.     Instructed patient our lactation team is here for continued support throughout their breastfeeding journey. Our team has encouraged patient to call with any questions or concerns that may arise after discharge.     All Lactation handouts listed below are in Cam[place]onian, Packet of Education handouts given to mom from [email or web address]  Breastfeeding Log  By [employer]   Congratulations on Your New Baby by Lactation education resources  5 Keys to Successful Breastfeeding by Lactation education resources  Breastfeeding in Hospital by Lactation education resources  Hunger Cues by Lactation education resources  Waking Sleepy Baby by Lactation education resources  Importance of Latch on by Lactation education resources  Positioning Latch on Baby Led by Lactation education resources  Positioning Latch on Mother Led by Lactation education resources  Check List  for positioning and latch by Lactation education resources  Breast Massage and Compression by Lactation education resources  Signs of a good feeding by Lactation education resources  Signs of a poor feeding by Lactation education resources  Getting enough to eat by Lactation education resources  Hand expression of breast milk by Lactation education resources  Calming a crying  by Lactation education resources  Survival guide for 1st two weeks by Lactation education resources  Storage and handling of Breast milk by Lactation education resources  Increasing supply by Lactation education resources  Let Down by Lactation education resources  Sore Nipples by Lactation education resources  Baby's second day by Lactation education resources  Breast pump by Lactation education resources  Plugged duct mastitis by Lactation education resources

## 2024-09-30 NOTE — L&D DELIVERY NOTE
[facility]  Vaginal Delivery Note      Diagnosis   Intrauterine pregnancy @: 39w4d   Labor Status: Spontaneous Onset of Labor    Diagnosis:   Abdominal pain during pregnancy in third trimester    39 weeks gestation of pregnancy           Delivery details     Delivery: Vaginal, Spontaneous     YOB: 2024    Time of Birth: 3:25 AM      Anesthesia: None     Delivering clinician: Rubén Barrett    Forceps?   No   Vacuum? No    Shoulder dystocia present: No      Infant details    Findings: female  infant     Infant observations: Weight: 3400 g (7 lb 7.9 oz)   Length: 20  in   Apgars: 7  @ 1 minute /    9  @ 5 minutes     Placenta, Cord, and Fluid    Placenta delivered  Spontaneous  at   2024  3:30 AM     Cord: 3 vessels  present.   Nuchal Cord?  yes; Number of nuchal loops present:  1    Cord blood obtained: Yes      Repair    Episiotomy: None    Lacerations: No   Estimated Blood Loss:  150cc   Suture used for repair: 3-0 Vicryl     Delivery Details:    Ashli, a 29 y.o.  at 39w4d, delivered a viable female  infant at 3:25 AM  on 2024 . She was complete and began pushing at 2024  3:10 AM . The fetal vertex was delivered in Vertex    Occiput  Anterior  position. A tight nuchal cord x 1 was reduced after delivery. The right anterior shoulder was delivered atraumatically by maternal expulsive efforts with the assistance of gentle downward traction. The posterior shoulder delivered with maternal expulsive efforts and gentle upward traction. The remainder of the fetus delivered spontaneously. Following a 60 second delay in cord clamping, the cord was clamped x2 and cut. Cord blood was obtained for analysis and arterial blood gas analysis. During the third stage of labor, IV pitocin was administered to enhance uterine contraction. The placenta delivered spontaneously, intact, with a three vessel cord at 2024  3:30 AM . Placental disposition was discarded . Fundal massage performed  and fundus found to be firm. Perineum, vagina, cervix were inspected, and the following lacerations were noted: 1st degree perineal. The laceration was infiltrated with 1% lidocaine and repaired with 2 interrupted stitch of 3-0 Vicryl.  Excellent hemostasis was noted. Needle count correct. Infant and patient in delivery room in good and stable condition.      Complications  none    Disposition  Mother to Mother Baby/Postpartum  in stable condition currently.  Baby to remains with mom  in stable condition currently.      Rubén Barrett DO  09/30/24  03:57 CDT

## 2024-09-30 NOTE — NURSING NOTE
Pt ambulates to bathroom with strong steady gait. Voids without difficulty. Lesia care instructed and provided. Gown changed.

## 2024-09-30 NOTE — PLAN OF CARE
Problem: Adult Inpatient Plan of Care  Goal: Patient-Specific Goal (Individualized)  9/30/2024 1525 by Cora Hinojosa RNA  Outcome: Progressing   Goal Outcome Evaluation:  Plan of Care Reviewed With: patient        Progress: improving  Outcome Evaluation: VSS WNL. FML 1U. Scant lochia. Breast and formula feeding. Voiding. No c/o pain this shift.

## 2024-09-30 NOTE — PLAN OF CARE
Goal Outcome Evaluation:  Plan of Care Reviewed With: patient        Progress: improving  Outcome Evaluation: Pt delivered vaginally with first degree with repair. No epidural. Plans to breastfeed and formula feed . Voided without dificulty. Bonding appropriately with .

## 2024-09-30 NOTE — H&P
[facility]  Obstetric history and physical        Subjective     Ashli is a 29 y.o.  currently at 39w4d, who presents to the [facility] with contraction that began around 7pm this evening. Denied loss of fluid. Good fetal movement.       Prenatal Information:  Prenatal Results       Initial Prenatal Labs       Test Value Reference Range Date Time    Hemoglobin        Hematocrit        Platelets        Rubella IgG  1.85 index Immune >0.99 24 0949    Hepatitis B SAg  Negative  Negative 24 0949    Hepatitis C Ab  Non Reactive  Non Reactive 24 0949    RPR  Non Reactive  Non Reactive 24 0831       Non Reactive  Non Reactive 24 0949    T. Pallidum Ab         ABO  O   24 0949    Rh  Positive   24 0949    Antibody Screen        HIV  Non Reactive  Non Reactive 24 0949    Urine Culture  Final report   24 0949    Gonorrhea  Negative  Negative 24 0930       Negative  Negative 24 1020       Negative  Negative 24 0949    Chlamydia  Negative  Negative 24 0930       Negative  Negative 24 1020       Negative  Negative 24 0949    TSH        HgB A1c         Varicella IgG        Hemoglobinopathy Fractionation        Hemoglobinopathy (genetic testing)        Cystic fibrosis                   Fetal testing        Test Value Reference Range Date Time    NIPT        MSAFP        AFP-4                  2nd and 3rd Trimester       Test Value Reference Range Date Time    Hemoglobin (repeated)  11.4 g/dL 11.1 - 15.9 24 0831       11.4 g/dL 11.1 - 15.9 24 0949    Hematocrit (repeated)  34.9 % 34.0 - 46.6 24 0949    Platelets   192 x10E3/uL 150 - 450 24 0949    1 hour GTT   146 mg/dL 70 - 139 24 0831    Antibody Screen (repeated)        3rd TM syphilis scrn (repeated)  RPR   Non Reactive  Non Reactive 24 0831    3rd TM syphilis scrn (repeated) TP-Ab        3rd TM syphilis screen TB-Ab (FTA)        Syphilis cascade test  TP-Ab (EIA)        Syphilis cascade TPPA        GTT Fasting  70 mg/dL 70 - 94 24 0706    GTT 1 Hr  181 mg/dL 70 - 179 24 0706    GTT 2 Hr  103 mg/dL 70 - 154 24 0706    GTT 3 Hr  135 mg/dL 70 - 139 24 0706    Group B Strep  Negative  Negative 24 1020              Other testing        Test Value Reference Range Date Time    Parvo IgG         CMV IgG                      External Prenatal Results       Pregnancy Outside Results - Transcribed From Office Records - See Scanned Records For Details       Test Value Date Time    ABO  O  24 0949    Rh  Positive  24 0949    Antibody Screen  Negative  24 0949    Varicella IgG       Rubella  1.85 index 24 0949    Hgb  11.4 g/dL 24 0831       11.4 g/dL 24 0949    Hct  34.9 % 24 0949    HgB A1c        1h GTT  146 mg/dL 24 0831    3h GTT Fasting  70 mg/dL 24 0706    3h GTT 1 hour  181 mg/dL 24 0706    3h GTT 2 hour  103 mg/dL 24 0706    3h GTT 3 hour   135 mg/dL 24 0706    Gonorrhea (discrete)  Negative  24 0930       Negative  24 1020       Negative  24 0949    Chlamydia (discrete)  Negative  24 0930       Negative  24 1020       Negative  24 0949    RPR  Non Reactive  24 0831       Non Reactive  24 0949    Syphils cascade: TP-Ab (FTA)       TP-Ab       TP-Ab (EIA)       TPPA       HBsAg  Negative  24 0949    Herpes Simplex Virus PCR       Herpes Simplex VIrus Culture       HIV  Non Reactive  24 0949    Hep C RNA Quant PCR       Hep C Antibody  Non Reactive  24 0949    AFP       NIPT       Cystic Fibroisis        Group B Strep  Negative  24 1020    GBS Susceptibility to Clindamycin       GBS Susceptibility to Erythromycin       Fetal Fibronectin       Genetic Testing, Maternal Blood                   Past OB History:     OB History    Para Term  AB Living   2 1 1 0 0 1   SAB IAB Ectopic Molar  Multiple Live Births   0 0 0 0 0 1      # Outcome Date GA Lbr Roberto/2nd Weight Sex Type Anes PTL Lv   2 Current            1 Term 2012    M Vag-Spont   ALANA       Past Medical History: History reviewed. No pertinent past medical history.   Past Surgical History History reviewed. No pertinent surgical history.   Family History: History reviewed. No pertinent family history.   Social History:  reports that she has never smoked. She has never used smokeless tobacco.   reports no history of alcohol use.   reports no history of drug use.            Objective       Vital Signs Range for the last 24 hours  Temperature: Temp:  [98 °F (36.7 °C)] 98 °F (36.7 °C)       BP: BP: (100-138)/(54-84) 100/54   Pulse: Heart Rate:  [56-77] 63   Respirations: Resp:  [18] 18   SPO2: SpO2:  [97 %-100 %] 98 %   O2 Devices Device (Oxygen Therapy): room air   Weight:  BMI: Weight:  [69.4 kg (153 lb)] 69.4 kg (153 lb)  Body mass index is 28.91 kg/m².     Physical Examination: General appearance - alert, well appearing, and in no distress and in mild to moderate distress        Cervix: Exam by: me   Dilation:  9.5   Effacement:  100   Station:  0           Assessment & Plan   Ashli is a 29 y.o.  currently at 39w4d presented with contractions and made rapid change from 1-2 cm to 9cm with bulging bag of water.      Plan  I called and spoke with Dr. Quiñonez. I will plan to manage patient's labor and delivery.  Admit to L&D  Routine labor orders  GBS negative  AROM'd with blood tinged fluid. Progressed to completed.

## 2024-09-30 NOTE — PROGRESS NOTES
"      Lana Pedersen CNM  [facility]  [address]  [place] [address]  Office [phone]  Fax [phone]    [facility]  Vaginal Delivery Progress Note    Subjective   Postpartum Day 0: Vaginal Delivery    The patient feels well.  Her pain is well controlled with nothing.   She is ambulating well.  Patient describes her bleeding as thin lochia.    Breastfeeding: infant latching without difficulty without pain.    Objective     Vital Signs Range for the last 24 hours  Temperature: Temp:  [97.4 °F (36.3 °C)-98.8 °F (37.1 °C)] 97.4 °F (36.3 °C)   Temp Source: Temp src: Oral   BP: BP: (100-138)/(54-84) 110/68   Pulse: Heart Rate:  [] 59   Respirations: Resp:  [16-18] 18   SPO2: SpO2:  [97 %-100 %] 98 %   O2 Amount (l/min):     O2 Devices Device (Oxygen Therapy): room air   Weight: Weight:  [69.4 kg (153 lb)] 69.4 kg (153 lb)     Admit Height:  Height: 154.9 cm (61\")      Physical Exam:  General:  no acute distresss.  Abdomen: abdomen is soft without significant tenderness, masses, organomegaly or guarding. Fundus: appropriate, firm, non tender  Extremities: normal, atraumatic, no cyanosis, and trace edema.   Infant currently breastfeeding    Lab results reviewed:  Yes   Rubella:  No results found for: \"RUBELLAIGGIN\" Nurse Transcribed from prenatal record --  No components found for: \"EXTRUBELQUAL\"  Rh Status:    RH type   Date Value Ref Range Status   09/30/2024 Positive  Final     Comment:     PRIOR ABORH RECORDS NOT AVAILABLE FOR VERIFICATION     Immunizations:   Immunization History   Administered Date(s) Administered    Tdap 08/15/2024     Lab Results (last 24 hours)       Procedure Component Value Units Date/Time    CBC (No Diff) [[id number]]  (Abnormal) Collected: 09/30/24 0248    Specimen: Blood Updated: 09/30/24 0314     WBC 12.59 10*3/mm3      RBC 4.52 10*6/mm3      Hemoglobin 11.4 g/dL      Hematocrit 36.4 %      MCV 80.5 fL      MCH 25.2 pg      MCHC 31.3 g/dL      RDW 15.9 %      RDW-SD " 45.9 fl      MPV 13.1 fL      Platelets 176 10*3/mm3     Treponema pallidum AB w/Reflex RPR [[id number]] Collected: 09/30/24 0248    Specimen: Blood Updated: 09/30/24 0300            External Prenatal Results       Pregnancy Outside Results - Transcribed From Office Records - See Scanned Records For Details       Test Value Date Time    ABO  O  09/30/24 0248    Rh  Positive  09/30/24 0248    Antibody Screen  Negative  09/30/24 0248       Negative  06/05/24 0949    Varicella IgG       Rubella  1.85 index 06/05/24 0949    Hgb  11.4 g/dL 09/30/24 0248       11.4 g/dL 07/18/24 0831       11.4 g/dL 06/05/24 0949    Hct  36.4 % 09/30/24 0248       34.9 % 06/05/24 0949    HgB A1c        1h GTT  146 mg/dL 07/18/24 0831    3h GTT Fasting  70 mg/dL 08/05/24 0706    3h GTT 1 hour  181 mg/dL 08/05/24 0706    3h GTT 2 hour  103 mg/dL 08/05/24 0706    3h GTT 3 hour   135 mg/dL 08/05/24 0706    Gonorrhea (discrete)  Negative  09/17/24 0930       Negative  09/03/24 1020       Negative  06/05/24 0949    Chlamydia (discrete)  Negative  09/17/24 0930       Negative  09/03/24 1020       Negative  06/05/24 0949    RPR  Non Reactive  07/18/24 0831       Non Reactive  06/05/24 0949    Syphils cascade: TP-Ab (FTA)       TP-Ab       TP-Ab (EIA)       TPPA       HBsAg  Negative  06/05/24 0949    Herpes Simplex Virus PCR       Herpes Simplex VIrus Culture       HIV  Non Reactive  06/05/24 0949    Hep C RNA Quant PCR       Hep C Antibody  Non Reactive  06/05/24 0949    AFP       NIPT       Cystic Fibroisis        Group B Strep  Negative  09/03/24 1020    GBS Susceptibility to Clindamycin       GBS Susceptibility to Erythromycin       Fetal Fibronectin       Genetic Testing, Maternal Blood                 Drug Screening       Test Value Date Time    Urine Drug Screen       Amphetamine Screen       Barbiturate Screen       Benzodiazepine Screen       Methadone Screen       Phencyclidine Screen       Opiates Screen       THC Screen       Cocaine  Screen       Propoxyphene Screen       Buprenorphine Screen       Methamphetamine Screen       Oxycodone Screen       Tricyclic Antidepressants Screen                 Legend    ^: Historical                            Assessment & Plan       * No active hospital problems. *      Ashli Pearl is Day 0  post-partum  Vaginal, Spontaneous   .      Plan:  Continue current care.      This note has been signed electronically.    Lana RING CNM  9/30/2024  08:46 CDT

## 2024-10-01 LAB
HCT VFR BLD AUTO: 28.2 % (ref 34–46.6)
HGB BLD-MCNC: 9 G/DL (ref 12–15.9)

## 2024-10-01 PROCEDURE — 85014 HEMATOCRIT: CPT | Performed by: OBSTETRICS & GYNECOLOGY

## 2024-10-01 PROCEDURE — 85018 HEMOGLOBIN: CPT | Performed by: OBSTETRICS & GYNECOLOGY

## 2024-10-01 RX ADMIN — DOCUSATE SODIUM 100 MG: 100 CAPSULE, LIQUID FILLED ORAL at 09:10

## 2024-10-01 RX ADMIN — DOCUSATE SODIUM 100 MG: 100 CAPSULE, LIQUID FILLED ORAL at 20:00

## 2024-10-01 RX ADMIN — PRENATAL VIT W/ FE FUMARATE-FA TAB 27-0.8 MG 1 TABLET: 27-0.8 TAB at 09:10

## 2024-10-01 NOTE — PROGRESS NOTES
"      JHONATHAN Tejeda  [facility]  [address]  [place] [address]  Office [phone]  Fax [phone]    [facility]  Vaginal Delivery Progress Note    Subjective   Postpartum Day 1: Vaginal Delivery    Utilized telephone  service (Bennett ID # [id number]).     Patient is feeling well. She is using only the comfort products for discomfort. Ambulating and voiding without difficulty. Describes her bleeding as \"not bad.\" Breastfeeding and infant is latching. She has supplemented with formula.    Objective     Vital Signs Range for the last 24 hours  Temperature: Temp:  [97.4 °F (36.3 °C)-98.4 °F (36.9 °C)] 98.4 °F (36.9 °C)   Temp Source: Temp src: Temporal   BP: BP: (105-112)/(58-68) 108/58   Pulse: Heart Rate:  [59-72] 63   Respirations: Resp:  [16-18] 16   SPO2: SpO2:  [97 %-99 %] 99 %   O2 Amount (l/min):     O2 Devices Device (Oxygen Therapy): room air   Weight:       Admit Height:  Height: 154.9 cm (61\")      Physical Exam:  General:  no acute distresss.  Abdomen: soft, nontender; fundus firm  Extremities: calves nontender; movement without difficulty; trace edema    Lab results reviewed:  Yes   Rubella:  No results found for: \"RUBELLAIGGIN\" Nurse Transcribed from prenatal record --  No components found for: \"EXTRUBELQUAL\"  Rh Status:    RH type   Date Value Ref Range Status   09/30/2024 Positive  Final     Comment:     PRIOR ABORH RECORDS NOT AVAILABLE FOR VERIFICATION     Immunizations:   Immunization History   Administered Date(s) Administered    Tdap 08/15/2024     Lab Results (last 24 hours)       Procedure Component Value Units Date/Time    Hemoglobin & Hematocrit, Blood [[id number]]  (Abnormal) Collected: 10/01/24 0535    Specimen: Blood Updated: 10/01/24 0605     Hemoglobin 9.0 g/dL      Hematocrit 28.2 %     Treponema pallidum AB w/Reflex RPR [[id number]]  (Normal) Collected: 09/30/24 0248    Specimen: Blood Updated: 09/30/24 1129     Treponemal AB Total Non-Reactive    Narrative: "      Reactive results will reflex RPR testing.            External Prenatal Results       Pregnancy Outside Results - Transcribed From Office Records - See Scanned Records For Details       Test Value Date Time    ABO  O  09/30/24 0248    Rh  Positive  09/30/24 0248    Antibody Screen  Negative  09/30/24 0248       Negative  06/05/24 0949    Varicella IgG       Rubella  1.85 index 06/05/24 0949    Hgb  9.0 g/dL 10/01/24 0535       11.4 g/dL 09/30/24 0248       11.4 g/dL 07/18/24 0831       11.4 g/dL 06/05/24 0949    Hct  28.2 % 10/01/24 0535       36.4 % 09/30/24 0248       34.9 % 06/05/24 0949    HgB A1c        1h GTT  146 mg/dL 07/18/24 0831    3h GTT Fasting  70 mg/dL 08/05/24 0706    3h GTT 1 hour  181 mg/dL 08/05/24 0706    3h GTT 2 hour  103 mg/dL 08/05/24 0706    3h GTT 3 hour   135 mg/dL 08/05/24 0706    Gonorrhea (discrete)  Negative  09/17/24 0930       Negative  09/03/24 1020       Negative  06/05/24 0949    Chlamydia (discrete)  Negative  09/17/24 0930       Negative  09/03/24 1020       Negative  06/05/24 0949    RPR  Non Reactive  07/18/24 0831       Non Reactive  06/05/24 0949    Syphils cascade: TP-Ab (FTA)  Non-Reactive  09/30/24 0248    TP-Ab  Non-Reactive  09/30/24 0248    TP-Ab (EIA)       TPPA       HBsAg  Negative  06/05/24 0949    Herpes Simplex Virus PCR       Herpes Simplex VIrus Culture       HIV  Non Reactive  06/05/24 0949    Hep C RNA Quant PCR       Hep C Antibody  Non Reactive  06/05/24 0949    AFP       NIPT       Cystic Fibroisis        Group B Strep  Negative  09/03/24 1020    GBS Susceptibility to Clindamycin       GBS Susceptibility to Erythromycin       Fetal Fibronectin       Genetic Testing, Maternal Blood                 Drug Screening       Test Value Date Time    Urine Drug Screen       Amphetamine Screen       Barbiturate Screen       Benzodiazepine Screen       Methadone Screen       Phencyclidine Screen       Opiates Screen       THC Screen       Cocaine Screen        Propoxyphene Screen       Buprenorphine Screen       Methamphetamine Screen       Oxycodone Screen       Tricyclic Antidepressants Screen                 Legend    ^: Historical                            Assessment & Plan       * No active hospital problems. *      Ashli Pearl is Day 1  post-partum  Vaginal, Spontaneous   .      Plan:  Continue postpartum plan of care. Lactation support as needed.      This note has been signed electronically.    Lety Whelan DNP, APRN, CNM, RNC-OB  10/1/2024  07:32 CDT

## 2024-10-01 NOTE — PLAN OF CARE
Goal Outcome Evaluation:    VSS. Ambulating, voiding, passing gas. FF, remains deviated to R. 1U. Scant. Comfort products provided and pt. using. Breastfeeding going well. Pt. Voices no c/o pain/discomfort. Bonding well with baby. Interpreting services utilized.

## 2024-10-02 VITALS
WEIGHT: 153 LBS | TEMPERATURE: 98.9 F | DIASTOLIC BLOOD PRESSURE: 65 MMHG | BODY MASS INDEX: 28.89 KG/M2 | HEART RATE: 73 BPM | HEIGHT: 61 IN | SYSTOLIC BLOOD PRESSURE: 100 MMHG | OXYGEN SATURATION: 98 % | RESPIRATION RATE: 16 BRPM

## 2024-10-02 RX ORDER — IBUPROFEN 800 MG/1
800 TABLET, FILM COATED ORAL EVERY 8 HOURS PRN
Qty: 30 TABLET | Refills: 0 | Status: SHIPPED | OUTPATIENT
Start: 2024-10-02

## 2024-10-02 RX ADMIN — DOCUSATE SODIUM 100 MG: 100 CAPSULE, LIQUID FILLED ORAL at 09:08

## 2024-10-02 RX ADMIN — PRENATAL VIT W/ FE FUMARATE-FA TAB 27-0.8 MG 1 TABLET: 27-0.8 TAB at 09:08

## 2024-10-02 NOTE — PLAN OF CARE
Goal Outcome Evaluation:         VSS.FFUU. shifted to the right slightly but has been patients baseline. Scant lochia. Pt did not complain of any pain this shift. Breastfeeding/formula feeding. Bonding well w infant.

## 2024-10-02 NOTE — PROGRESS NOTES
"      Milvia Solorzano MD  [facility]  [address]  [place] [address]  Office [phone]  Fax [phone]    [facility]  Vaginal Delivery Progress Note    Subjective   Postpartum Day 2: Vaginal Delivery    The patient feels well.  Her pain is well controlled with nonsteroidal anti-inflammatory drugs and Tylenol.   She is ambulating well.  Patient describes her bleeding as thin lochia.    Breastfeeding: without difficulty.    Objective     Vital Signs Range for the last 24 hours  Temperature: Temp:  [98.3 °F (36.8 °C)-98.9 °F (37.2 °C)] 98.9 °F (37.2 °C)   Temp Source: Temp src: Temporal   BP: BP: (100-105)/(58-59) 100/59   Pulse: Heart Rate:  [70-74] 74   Respirations: Resp:  [16] 16   SPO2: SpO2:  [97 %] 97 %   O2 Amount (l/min):     O2 Devices Device (Oxygen Therapy): room air   Weight:       Admit Height:  Height: 154.9 cm (61\")      Physical Exam:  General:  no acute distresss.  Abdomen: abdomen is soft without significant tenderness, masses, organomegaly or guarding.  Extremities: normal, atraumatic, no cyanosis, and trace edema.       Lab results reviewed:  Yes   Rubella:  No results found for: \"RUBELLAIGGIN\" Nurse Transcribed from prenatal record --  No components found for: \"EXTRUBELQUAL\"  Rh Status:    RH type   Date Value Ref Range Status   09/30/2024 Positive  Final     Comment:     PRIOR ABORH RECORDS NOT AVAILABLE FOR VERIFICATION     Immunizations:   Immunization History   Administered Date(s) Administered    Tdap 08/15/2024     Lab Results (last 24 hours)       ** No results found for the last 24 hours. **            External Prenatal Results       Pregnancy Outside Results - Transcribed From Office Records - See Scanned Records For Details       Test Value Date Time    ABO  O  09/30/24 0248    Rh  Positive  09/30/24 0248    Antibody Screen  Negative  09/30/24 0248       Negative  06/05/24 0949    Varicella IgG       Rubella  1.85 index 06/05/24 0949    Hgb  9.0 g/dL 10/01/24 " 0535       11.4 g/dL 09/30/24 0248       11.4 g/dL 07/18/24 0831       11.4 g/dL 06/05/24 0949    Hct  28.2 % 10/01/24 0535       36.4 % 09/30/24 0248       34.9 % 06/05/24 0949    HgB A1c        1h GTT  146 mg/dL 07/18/24 0831    3h GTT Fasting  70 mg/dL 08/05/24 0706    3h GTT 1 hour  181 mg/dL 08/05/24 0706    3h GTT 2 hour  103 mg/dL 08/05/24 0706    3h GTT 3 hour   135 mg/dL 08/05/24 0706    Gonorrhea (discrete)  Negative  09/17/24 0930       Negative  09/03/24 1020       Negative  06/05/24 0949    Chlamydia (discrete)  Negative  09/17/24 0930       Negative  09/03/24 1020       Negative  06/05/24 0949    RPR  Non Reactive  07/18/24 0831       Non Reactive  06/05/24 0949    Syphils cascade: TP-Ab (FTA)  Non-Reactive  09/30/24 0248    TP-Ab  Non-Reactive  09/30/24 0248    TP-Ab (EIA)       TPPA       HBsAg  Negative  06/05/24 0949    Herpes Simplex Virus PCR       Herpes Simplex VIrus Culture       HIV  Non Reactive  06/05/24 0949    Hep C RNA Quant PCR       Hep C Antibody  Non Reactive  06/05/24 0949    AFP       NIPT       Cystic Fibroisis        Group B Strep  Negative  09/03/24 1020    GBS Susceptibility to Clindamycin       GBS Susceptibility to Erythromycin       Fetal Fibronectin       Genetic Testing, Maternal Blood                 Drug Screening       Test Value Date Time    Urine Drug Screen       Amphetamine Screen       Barbiturate Screen       Benzodiazepine Screen       Methadone Screen       Phencyclidine Screen       Opiates Screen       THC Screen       Cocaine Screen       Propoxyphene Screen       Buprenorphine Screen       Methamphetamine Screen       Oxycodone Screen       Tricyclic Antidepressants Screen                 Legend    ^: Historical                            Assessment & Plan       * No active hospital problems. *      Ashli Pearl is Day 2  post-partum  Vaginal, Spontaneous   .      Plan:  Discharge home with standard precautions and return to clinic in 4-6  weeks.      Milvia Solorzano MD  10/2/2024  07:30 CDT

## 2024-10-02 NOTE — DISCHARGE SUMMARY
[facility]    Milvia Solorzano MD  [address]  [place] [address]  [phone]      Dis[facility]    [facility]  : 1995  MRN: [id number]  CSN: [id number]    Date of Admission: 2024   Date of Discharge:  10/2/2024   Delivering Physician: Rubén Barrett        Admission Diagnosis: Pregnancy [Z34.90]   Discharge Diagnosis: Pregnancy at 39w4d - delivered       Procedures: 2024  - Vaginal, Spontaneous       Hospital Course  Patient is a 29 y.o.  who at 39w4d had a vaginal birth.  Her postpartum course was without complications.  On PPD #2 she was ready for discharge.  She had normal lochia and pain well controlled with oral medications.    Infant  female  fetus weighing 3400 g (7 lb 7.9 oz)   Apgars -  7 @ 1 minute /  9 @ 5 minutes.    Discharge labs  Lab Results   Component Value Date    WBC 12.59 (H) 2024    HGB 9.0 (L) 10/01/2024    HCT 28.2 (L) 10/01/2024     2024       Discharge Medications     Discharge Medications      Patient Not Prescribed Medications Upon Discharge         External Prenatal Results       Pregnancy Outside Results - Transcribed From Office Records - See Scanned Records For Details       Test Value Date Time    ABO  O  24 0248    Rh  Positive  24 0248    Antibody Screen  Negative  24 0248       Negative  24 0949    Varicella IgG       Rubella  1.85 index 24 0949    Hgb  9.0 g/dL 10/01/24 0535       11.4 g/dL 24 0248       11.4 g/dL 24 0831       11.4 g/dL 24 0949    Hct  28.2 % 10/01/24 0535       36.4 % 24 0248       34.9 % 24 0949    HgB A1c        1h GTT  146 mg/dL 24 0831    3h GTT Fasting  70 mg/dL 24 0706    3h GTT 1 hour  181 mg/dL 24 0706    3h GTT 2 hour  103 mg/dL 24 0706    3h GTT 3 hour   135 mg/dL 24 0706    Gonorrhea (discrete)  Negative  24 0930       Negative  24  1020       Negative  06/05/24 0949    Chlamydia (discrete)  Negative  09/17/24 0930       Negative  09/03/24 1020       Negative  06/05/24 0949    RPR  Non Reactive  07/18/24 0831       Non Reactive  06/05/24 0949    Syphils cascade: TP-Ab (FTA)  Non-Reactive  09/30/24 0248    TP-Ab  Non-Reactive  09/30/24 0248    TP-Ab (EIA)       TPPA       HBsAg  Negative  06/05/24 0949    Herpes Simplex Virus PCR       Herpes Simplex VIrus Culture       HIV  Non Reactive  06/05/24 0949    Hep C RNA Quant PCR       Hep C Antibody  Non Reactive  06/05/24 0949    AFP       NIPT       Cystic Fibroisis        Group B Strep  Negative  09/03/24 1020    GBS Susceptibility to Clindamycin       GBS Susceptibility to Erythromycin       Fetal Fibronectin       Genetic Testing, Maternal Blood                 Drug Screening       Test Value Date Time    Urine Drug Screen       Amphetamine Screen       Barbiturate Screen       Benzodiazepine Screen       Methadone Screen       Phencyclidine Screen       Opiates Screen       THC Screen       Cocaine Screen       Propoxyphene Screen       Buprenorphine Screen       Methamphetamine Screen       Oxycodone Screen       Tricyclic Antidepressants Screen                 Legend    ^: Historical                            Discharge Disposition    Condition on Discharge: good   Follow-up: 2 weeks with Machelle Solorzano MD  10/2/2024

## 2024-10-02 NOTE — DISCHARGE INSTR - APPOINTMENTS
Appointment with Lety Whelan on October 17th at 11:30 am     Appointment with  on November 12th at 9:30 am

## 2024-10-11 ENCOUNTER — MATERNAL SCREENING (OUTPATIENT)
Dept: CALL CENTER | Facility: HOSPITAL | Age: 29
End: 2024-10-11
Payer: MEDICAID

## 2024-10-11 NOTE — OUTREACH NOTE
"Maternal Screening Survey      Flowsheet Row Responses   Facility patient discharged from? [facility]   Attempt successful? No   Unsuccessful attempts Attempt 2  [Needs  but when you call states \"phone cannot except calls at this time\"]              Lizzy DUBON - Registered Nurse          "

## 2024-10-11 NOTE — OUTREACH NOTE
Maternal Screening Survey      Flowsheet Row Responses   Facility patient discharged from? [place]   Attempt successful? No   Unsuccessful attempts Attempt 1  [attempted call using a [employer]  (#[id number])]              Lizzy DUBON - Registered Nurse

## 2024-10-12 ENCOUNTER — MATERNAL SCREENING (OUTPATIENT)
Dept: CALL CENTER | Facility: HOSPITAL | Age: 29
End: 2024-10-12
Payer: MEDICAID

## 2024-10-12 NOTE — OUTREACH NOTE
Maternal Screening Survey      Flowsheet Row Responses   Facility patient discharged from? [place]   Attempt successful? No   Unsuccessful attempts Attempt 3   Revoke Phone number issues              Rosa Elena S - Registered Nurse

## 2024-10-25 ENCOUNTER — POSTPARTUM VISIT (OUTPATIENT)
Age: 29
End: 2024-10-25
Payer: MEDICAID

## 2024-10-25 VITALS
HEIGHT: 61 IN | BODY MASS INDEX: 25.49 KG/M2 | DIASTOLIC BLOOD PRESSURE: 66 MMHG | WEIGHT: 135 LBS | SYSTOLIC BLOOD PRESSURE: 108 MMHG

## 2024-10-25 NOTE — PROGRESS NOTES
"Subjective   Chief Complaint   Patient presents with    Postpartum Care     Vaginal delivery, 2024, baby girl.  name: [place]ana [id number]     Ashli Pearl is a 29 y.o. year old  presenting to be seen for a postpartum visit.  She had a vaginal delivery.   Prenatal course was notable for late prenatal.care.    She is breastfeeding and denies concerns. Describes bleeding as normal. Has not been sexually active. Denies postpartum mood concerns.     The following portions of the patient's history were reviewed and updated as appropriate:problem list, current medications, and allergies    Social History     Socioeconomic History    Marital status: Single   Tobacco Use    Smoking status: Never    Smokeless tobacco: Never   Vaping Use    Vaping status: Never Used   Substance and Sexual Activity    Alcohol use: Never    Drug use: Never    Sexual activity: Never     Review of Systems   Constitutional:  Positive for fatigue.   Respiratory:  Negative for chest tightness and shortness of breath.    Cardiovascular:  Negative for chest pain, palpitations and leg swelling.   Gastrointestinal:  Negative for abdominal pain, constipation and diarrhea.   Genitourinary:  Positive for vaginal bleeding. Negative for difficulty urinating, dysuria, flank pain, frequency, pelvic pain, urgency and vaginal discharge.   Musculoskeletal:  Negative for back pain.   Skin:  Negative for pallor, rash and wound.   Neurological:  Negative for dizziness and headaches.   Hematological:  Negative for adenopathy.   Psychiatric/Behavioral:  Negative for dysphoric mood, self-injury and suicidal ideas. The patient is not nervous/anxious.          Objective   /66   Ht 154.9 cm (61\")   Wt 61.2 kg (135 lb)   Breastfeeding Yes   BMI 25.51 kg/m²     General:  well developed; well nourished  no acute distress   Abdomen: soft, non-tender; no masses   Pelvis: Not performed.     Pap smear: 2024 NILM     Diagnoses and all " orders for this visit:    1. Postpartum care following vaginal delivery (Primary)  EPDS today with score of 2. Discussed postpartum self-care measures to promote mental and physical well-being. Maintain pelvic rest but may resume other activities as tolerated. Undecided on contraception postpartum. Options discussed.     2. Lactating mother  Discussed outpatient lactation as needed. Continue with prenatal vitamin, otherwise start a  vitamin. Maintain adequate hydration and diet with an increased calorie intake of 500 asim/day.        Return to the office in 2 weeks as scheduled for a 6-week postpartum visit and as needed with concerns.        This note was electronically signed.    Lety Whelan, DNP, APRN, CNM, RNC-OB  10/25/2024

## 2024-11-12 ENCOUNTER — POSTPARTUM VISIT (OUTPATIENT)
Age: 29
End: 2024-11-12

## 2024-11-12 VITALS
SYSTOLIC BLOOD PRESSURE: 108 MMHG | HEIGHT: 61 IN | BODY MASS INDEX: 24.92 KG/M2 | WEIGHT: 132 LBS | DIASTOLIC BLOOD PRESSURE: 64 MMHG

## 2024-11-12 RX ORDER — POLYETHYLENE GLYCOL 3350 17 G/17G
17 POWDER, FOR SOLUTION ORAL DAILY
Qty: 30 PACKET | Refills: 4 | Status: SHIPPED | OUTPATIENT
Start: 2024-11-12

## 2024-11-12 NOTE — PROGRESS NOTES
"Ashli Pearl is here for a postpartum visit after a vaginal delivery 6 weeks ago.  The depression questionnaire has been completed.  She has no signs of postpartum depression today.  She has not had a period since her delivery and is breast-feeding her infant without difficulty.  Her last Pap smear was 2-3 years ago, and normal.  She has no history of cervical dysplasia.  She plans on using condoms for contraception.    /64   Ht 154.9 cm (61\")   Wt 59.9 kg (132 lb)   Breastfeeding Yes   BMI 24.94 kg/m²    In general pleasant female no acute distress  Neck no thyromegaly  Abdomen soft and nontender  A Pap smear was not performed.     Assessment: Normal postpartum exam.    We have discussed current Pap smear screening guidelines. She will contact the office if she desires hormonal contraception.  Ashli will return in 3 months for annual exam or sooner if needed.  "